# Patient Record
Sex: FEMALE | Race: BLACK OR AFRICAN AMERICAN
[De-identification: names, ages, dates, MRNs, and addresses within clinical notes are randomized per-mention and may not be internally consistent; named-entity substitution may affect disease eponyms.]

---

## 2017-12-20 ENCOUNTER — HOSPITAL ENCOUNTER (EMERGENCY)
Dept: HOSPITAL 62 - ER | Age: 27
Discharge: HOME | End: 2017-12-20
Payer: SELF-PAY

## 2017-12-20 VITALS — SYSTOLIC BLOOD PRESSURE: 134 MMHG | DIASTOLIC BLOOD PRESSURE: 72 MMHG

## 2017-12-20 DIAGNOSIS — G35: ICD-10-CM

## 2017-12-20 DIAGNOSIS — R51: ICD-10-CM

## 2017-12-20 DIAGNOSIS — M79.7: Primary | ICD-10-CM

## 2017-12-20 PROCEDURE — 99284 EMERGENCY DEPT VISIT MOD MDM: CPT

## 2017-12-20 PROCEDURE — 96374 THER/PROPH/DIAG INJ IV PUSH: CPT

## 2017-12-20 PROCEDURE — 96375 TX/PRO/DX INJ NEW DRUG ADDON: CPT

## 2017-12-20 NOTE — ER DOCUMENT REPORT
ED General





- General


Chief Complaint: Headache


Stated Complaint: HEADACHE


Time Seen by Provider: 12/20/17 01:40


Notes: 





Patient is a 27-year-old female with a history of migraines, fibromyalgia, and 

MS.  She presents because she has had 3 days of headache and body pain.  She 

says she is sore all over.  She is on several medications.  She says she has 

taken her medications was not helped.  I asked her if she is tried any over-the-

counter medication such as Motrin and Tylenol.  She says she has not.  She 

denies any weakness into her extremities.  No ocular symptoms.  No fevers.  No 

infections.  No recent trauma.  No other complaints at this time.


TRAVEL OUTSIDE OF THE [place] IN LAST 30 DAYS: No





Past Medical History





- Social History


Smoking Status: Never Smoker


Frequency of alcohol use: Occasional


Drug Abuse: None


Family History: Reviewed & Not Pertinent





Review of Systems





- Review of Systems


Notes: 





My Normal Review Basic





REVIEW OF SYSTEMS:


CONSTITUTIONAL :  Denies fever,  chills, or sweats.  Denies recent illness.


EENT:   Denies eye, ear, throat, or mouth pain or symptoms.  Denies nasal or 

sinus congestion.


CARDIOVASCULAR:  Denies chest pain.


RESPIRATORY:  Denies cough, cold, or chest congestion.  Denies shortness of 

breath, difficulty breathing, or wheezing.


GASTROINTESTINAL:  Denies abdominal pain.  Denies nausea, vomiting, or 

diarrhea.  Denies constipation.  Last BM: 


MUSCULOSKELETAL: Muscle aches


SKIN:   Denies rash or skin lesions.


NEUROLOGICAL:  Denies altered mental status or loss of consciousness.  Has a 

headache.  Denies weakness or paralysis or loss of use of either side.  Denies 

problems with gait or speech.  Denies sensory or motor loss.


ALL OTHER SYSTEMS REVIEWED AND NEGATIVE.





Physical Exam





- Vital signs


Vitals: 


 











Temp Pulse Resp BP Pulse Ox


 


 98.9 F   72   18   139/90 H  100 


 


 12/20/17 00:23  12/20/17 00:23  12/20/17 00:23  12/20/17 00:23  12/20/17 00:23














- Notes


Notes: 





General Appearance: Well nourished, alert, cooperative, no acute distress, mild 

obvious discomfort.  Well-appearing.


Vitals: reviewed, See vital signs table.


Head: no swelling or tenderness to the head


Eyes: PERRL, EOMI, Conjuctiva clear


Mouth: No decreasd moisture


Neck: Supple, some pain over the trapezius muscles and cervical paraspinal 

musculature.


Lungs: No wheezing, No rales, No rhonci, No accessory muscle use, good air 

exchange bilaterally.


Heart: Normal rate, Regular rythm, No murmur, no rub


Extremities: strength 5/5 in all extremities, good pulses in all extremities, 

she is able to lift all 4 extremities against resistance without any weakness.  

She has good strength in all 4 extremities.  She says she does have some 

soreness with movement of her extremities.  No focal swelling or redness to the 

joints.


Skin: warm, dry, appropriate color, no rash


Neuro: speech clear, oriented x 3, normal affect, responds appropriately to 

questions.  Nerves II through XII are intact.  Distal sensation intact.  

Patient was all extremities without difficulty.





Course





- Re-evaluation


Re-evalutation: 





12/20/17 03:39


Patient still is a slight headache but says she is feeling improved.  Her 

muscle aches are improving also.  Says that she wants to go home.  I do not 

suspect subarachnoid hemorrhage and that her headache is similar to her 

previous headaches and her headache was gradual worsening over a period of 3 

days.  She has no focal neurologic deficits or weakness.  She has no ocular 

motion deficits.  Nothing suggests that she has exacerbation or worsening of 

her MS.  Feel she is safe to be discharged home.  I encouraged her return to ER 

immediately if she has worsening pain, severe headache, vomiting, fevers, or 

she feels unwell.  Patient agrees with plan will be discharged home.





Dictation of this chart was performed using voice recognition software; 

therefore, there may be some unintended grammatical errors.





- Vital Signs


Vital signs: 


 











Temp Pulse Resp BP Pulse Ox


 


 98.4 F   71   18   134/72 H  100 


 


 12/20/17 03:11  12/20/17 03:11  12/20/17 00:23  12/20/17 03:11  12/20/17 03:11














Discharge





- Discharge


Clinical Impression: 


 Fibromyalgia





Headache


Qualifiers:


 Headache type: unspecified Headache chronicity pattern: unspecified pattern 

Intractability: not intractable Qualified Code(s): R51 - Headache





Condition: Good


Disposition: HOME, SELF-CARE


Additional Instructions: 


If you continue have headache or have recurrent headaches not responding to 

your home medications you can take the prescribed Reglan in conjunction with 

Benadryl.  Benadryl will make you sleepy so please do not drive after taking 

this.  Please consider taking ibuprofen.  Take this with some food so it does 

not irritate your stomach.  This will help with your muscle aches and pains are 

caused by fibromyalgia.  Please return to ER if you have vomiting, fevers, 

intractable headache, if you feel that your worsening.  Please follow-up with 

your doctor in 2-3 days for reevaluation.


Prescriptions: 


Metoclopramide HCl [Reglan 10 mg Tablet] 1 tab PO ASDIR PRN #25 tablet


 PRN Reason: 


Forms:  Return to Work

## 2018-02-15 ENCOUNTER — HOSPITAL ENCOUNTER (OUTPATIENT)
Dept: HOSPITAL 62 - RAD | Age: 28
Discharge: HOME | End: 2018-02-15
Attending: PSYCHIATRY & NEUROLOGY
Payer: MEDICAID

## 2018-02-15 VITALS — SYSTOLIC BLOOD PRESSURE: 122 MMHG | DIASTOLIC BLOOD PRESSURE: 77 MMHG

## 2018-02-15 DIAGNOSIS — G35: Primary | ICD-10-CM

## 2018-02-15 DIAGNOSIS — G47.30: ICD-10-CM

## 2018-02-15 DIAGNOSIS — Z79.899: ICD-10-CM

## 2018-02-15 LAB
APPEARANCE ALL TUBES: CLEAR
APTT BLD: 33.4 SEC (ref 23.5–35.8)
COLOR ALL TUBES: COLORLESS
CSF TOTAL VOLUME: 14.5 CC
CSF TUBE NUMBER: 3
GLUCOSE CSF-MCNC: 51 MG/DL (ref 40–70)
INR PPP: 0.95
PROTEIN,CSF: 24 MG/DL (ref 12–60)
PROTHROMBIN TIME: 13.4 SEC (ref 11.4–15.4)
VOLUME TUBE 1: 3 CC
VOLUME TUBE 2: 3.5 CC
VOLUME TUBE 3: 4 CC
VOLUME TUBE 4: 4 CC

## 2018-02-15 PROCEDURE — 83916 OLIGOCLONAL BANDS: CPT

## 2018-02-15 PROCEDURE — 85730 THROMBOPLASTIN TIME PARTIAL: CPT

## 2018-02-15 PROCEDURE — 77003 FLUOROGUIDE FOR SPINE INJECT: CPT

## 2018-02-15 PROCEDURE — 36415 COLL VENOUS BLD VENIPUNCTURE: CPT

## 2018-02-15 PROCEDURE — 87205 SMEAR GRAM STAIN: CPT

## 2018-02-15 PROCEDURE — 82945 GLUCOSE OTHER FLUID: CPT

## 2018-02-15 PROCEDURE — 009U3ZX DRAINAGE OF SPINAL CANAL, PERCUTANEOUS APPROACH, DIAGNOSTIC: ICD-10-PCS | Performed by: RADIOLOGY

## 2018-02-15 PROCEDURE — 62270 DX LMBR SPI PNXR: CPT

## 2018-02-15 PROCEDURE — 82784 ASSAY IGA/IGD/IGG/IGM EACH: CPT

## 2018-02-15 PROCEDURE — 85610 PROTHROMBIN TIME: CPT

## 2018-02-15 PROCEDURE — 87070 CULTURE OTHR SPECIMN AEROBIC: CPT

## 2018-02-15 PROCEDURE — 89050 BODY FLUID CELL COUNT: CPT

## 2018-02-15 PROCEDURE — 84157 ASSAY OF PROTEIN OTHER: CPT

## 2018-02-15 NOTE — RADIOLOGY REPORT (SQ)
EXAM DESCRIPTION:  LUMBAR PUNCTURE; FLUORO/NEEDLE PLACEMENT/SPINE



COMPLETED DATE/TIME:  2/15/2018 11:38 am



REASON FOR STUDY:  MS G35  MULTIPLE SCLEROSIS



COMPARISON:  None.



FLUOROSCOPY TIME:  0.4 minutes

1 images saved to PACS.



TECHNIQUE:  Fluoroscopic guided lumbar puncture with opening and closing pressures.



LIMITATIONS:  None.



PROCEDURE:  After written consent and assessment were obtained, the patient was brought into the fluo
roscopy room and placed prone on the table.  The patient's lower back was prepped in a sterile fashio
n and an entry site was selected under live fluoroscopic guidance. The entry site was anesthetized wi
th 1% lidocaine. A 22 gauge needle was advanced through the skin and into the thecal sac at the level
 of L2-L3.  An opening pressure of 25 water units was obtained. After approximately 14.5ml of CSF was
 drained, a closing pressure of 25 water units was obtained. The needle was removed and a sterile ban
dage was placed of the site. Specimens were sent to the lab for testing. A fluoroscopic spot image wa
s saved to PACS confirming level access.



FINDINGS:  Clear CSF



IMPRESSION:  Lumbar puncture under fluoroscopy. No immediate complication.



COMMENT:  Patient medication list reviewed:Yes- Quality ID# [id number]:Eligible professional attests to docu
menting in the medical record they obtained, updated, or reviewed the patient's current medications..


Quality :  Final reports for procedures using fluoroscopy that document radiation exposure olivia
pedro, or exposure time and number of fluorographic images (if radiation exposure indices are not avail
able)



TECHNICAL DOCUMENTATION:  JOB ID:  [id number]

 2011 [employer]- All Rights Reserved

## 2018-02-15 NOTE — RADIOLOGY REPORT (SQ)
EXAM DESCRIPTION:  LUMBAR PUNCTURE; FLUORO/NEEDLE PLACEMENT/SPINE



COMPLETED DATE/TIME:  2/15/2018 11:38 am



REASON FOR STUDY:  MS G35  MULTIPLE SCLEROSIS



COMPARISON:  None.



FLUOROSCOPY TIME:  0.4 minutes

1 images saved to PACS.



TECHNIQUE:  Fluoroscopic guided lumbar puncture with opening and closing pressures.



LIMITATIONS:  None.



PROCEDURE:  After written consent and assessment were obtained, the patient was brought into the fluo
roscopy room and placed prone on the table.  The patient's lower back was prepped in a sterile fashio
n and an entry site was selected under live fluoroscopic guidance. The entry site was anesthetized wi
th 1% lidocaine. A 22 gauge needle was advanced through the skin and into the thecal sac at the level
 of L2-L3.  An opening pressure of 25 water units was obtained. After approximately 14.5ml of CSF was
 drained, a closing pressure of 25 water units was obtained. The needle was removed and a sterile ban
dage was placed of the site. Specimens were sent to the lab for testing. A fluoroscopic spot image wa
s saved to PACS confirming level access.



FINDINGS:  Clear CSF



IMPRESSION:  Lumbar puncture under fluoroscopy. No immediate complication.



COMMENT:  Patient medication list reviewed:Yes- Quality ID# [id number]:Eligible professional attests to docu
menting in the medical record they obtained, updated, or reviewed the patient's current medications..


Quality :  Final reports for procedures using fluoroscopy that document radiation exposure olivia
pedro, or exposure time and number of fluorographic images (if radiation exposure indices are not avail
able)



TECHNICAL DOCUMENTATION:  JOB ID:  [id number]

 2011 [employer]- All Rights Reserved

## 2018-02-16 LAB
ALBUMIN SERPL-MCNC: 3.9 G/DL (ref 3.5–5.5)
IGG SERPL-MCNC: 1629 MG/DL (ref 700–1600)
IGG SYNTH RATE SER+CSF CALC-MRATE: -6.1 MG/DAY
IGG/ALB CLEAR SER+CSF-RTO: 0.5 (ref 0–0.7)
IGG/ALB CSF: 0.2 {RATIO} (ref 0–0.25)
IMMUNOGLOBULIN G CSF: 1.4 MG/DL (ref 0–8.6)

## 2018-02-17 LAB
ALB CSF/SERPL: 2 {RATIO} (ref 0–8)
ALBUMIN CSF-MCNC: 7 MG/DL (ref 11–48)